# Patient Record
Sex: MALE | Race: WHITE | Employment: STUDENT | URBAN - METROPOLITAN AREA
[De-identification: names, ages, dates, MRNs, and addresses within clinical notes are randomized per-mention and may not be internally consistent; named-entity substitution may affect disease eponyms.]

---

## 2021-07-07 ENCOUNTER — LAB ENCOUNTER (OUTPATIENT)
Dept: LAB | Age: 21
End: 2021-07-07
Attending: NURSE PRACTITIONER
Payer: COMMERCIAL

## 2021-07-07 ENCOUNTER — OFFICE VISIT (OUTPATIENT)
Dept: INTERNAL MEDICINE CLINIC | Facility: CLINIC | Age: 21
End: 2021-07-07
Payer: COMMERCIAL

## 2021-07-07 VITALS
DIASTOLIC BLOOD PRESSURE: 78 MMHG | OXYGEN SATURATION: 98 % | TEMPERATURE: 99 F | WEIGHT: 164 LBS | SYSTOLIC BLOOD PRESSURE: 116 MMHG | BODY MASS INDEX: 25.74 KG/M2 | HEART RATE: 76 BPM | HEIGHT: 67 IN | RESPIRATION RATE: 16 BRPM

## 2021-07-07 DIAGNOSIS — Z11.3 ENCOUNTER FOR SCREENING EXAMINATION FOR SEXUALLY TRANSMITTED DISEASE: ICD-10-CM

## 2021-07-07 DIAGNOSIS — J01.10 ACUTE NON-RECURRENT FRONTAL SINUSITIS: ICD-10-CM

## 2021-07-07 DIAGNOSIS — Z00.00 ANNUAL PHYSICAL EXAM: Primary | ICD-10-CM

## 2021-07-07 DIAGNOSIS — Z00.00 LABORATORY EXAMINATION ORDERED AS PART OF A ROUTINE GENERAL MEDICAL EXAMINATION: ICD-10-CM

## 2021-07-07 DIAGNOSIS — T78.40XS ALLERGY, SEQUELA: ICD-10-CM

## 2021-07-07 PROCEDURE — 3008F BODY MASS INDEX DOCD: CPT | Performed by: NURSE PRACTITIONER

## 2021-07-07 PROCEDURE — 3074F SYST BP LT 130 MM HG: CPT | Performed by: NURSE PRACTITIONER

## 2021-07-07 PROCEDURE — 3078F DIAST BP <80 MM HG: CPT | Performed by: NURSE PRACTITIONER

## 2021-07-07 PROCEDURE — 87591 N.GONORRHOEAE DNA AMP PROB: CPT | Performed by: NURSE PRACTITIONER

## 2021-07-07 PROCEDURE — 87491 CHLMYD TRACH DNA AMP PROBE: CPT | Performed by: NURSE PRACTITIONER

## 2021-07-07 PROCEDURE — 99385 PREV VISIT NEW AGE 18-39: CPT | Performed by: NURSE PRACTITIONER

## 2021-07-07 RX ORDER — FLUTICASONE PROPIONATE 50 MCG
SPRAY, SUSPENSION (ML) NASAL DAILY
COMMUNITY
End: 2021-11-27

## 2021-07-07 RX ORDER — CETIRIZINE HYDROCHLORIDE 10 MG/1
10 TABLET ORAL DAILY
COMMUNITY
End: 2021-07-07

## 2021-07-07 RX ORDER — FEXOFENADINE HCL 180 MG/1
180 TABLET ORAL DAILY
Qty: 30 TABLET | Refills: 0 | COMMUNITY
Start: 2021-07-07

## 2021-07-07 RX ORDER — AZITHROMYCIN 250 MG/1
TABLET, FILM COATED ORAL
Qty: 6 TABLET | Refills: 0 | Status: SHIPPED | OUTPATIENT
Start: 2021-07-07 | End: 2021-07-12

## 2021-07-07 NOTE — PATIENT INSTRUCTIONS
Afrin- dont use longer than 3 days  Stop zyrtec- add allegra- generic is ok    Prevention Guidelines, Men Ages 25 to 44  Screening tests and vaccines are an important part of managing your health.  A screening test is done to find possible disorders or di Chickenpox (varicella) All men in this age group who have no record of this infection or vaccine 2 doses; the second dose should be given at least 4 weeks after the first dose   Hepatitis A Men at increased risk for infection – talk with your healthcare pr active At routine exams   Skin cancer All men in this age group. At routine exams. You may be reminded to avoid intentional tanning and tanning beds.     1Those who are 25years of age, who are not up-to-date on their childhood immunizations, should get al

## 2021-07-07 NOTE — PROGRESS NOTES
Wellness Exam    CC: Patient is presenting for a wellness exam    HPI:   Current Complaints: new to our office. He goes to school in [place]. Hx of allergies, takes zyrtec and a nasal spray for about 1 year.  Over the last month notes more nasal congestion, PN m)   Wt 164 lb (74.4 kg)   SpO2 98%   BMI 25.69 kg/m²   Physical Exam   Constitutional: He is oriented to person, place, and time. He appears well-developed. No distress. HENT: Normocephalic and atraumatic. Nose normal. TMs pearly gray, + light reflex.

## 2021-07-08 LAB
C TRACH DNA SPEC QL NAA+PROBE: NEGATIVE
N GONORRHOEA DNA SPEC QL NAA+PROBE: NEGATIVE

## 2021-07-23 ENCOUNTER — LAB ENCOUNTER (OUTPATIENT)
Dept: LAB | Age: 21
End: 2021-07-23
Attending: NURSE PRACTITIONER
Payer: COMMERCIAL

## 2021-07-23 DIAGNOSIS — Z00.00 LABORATORY EXAMINATION ORDERED AS PART OF A ROUTINE GENERAL MEDICAL EXAMINATION: ICD-10-CM

## 2021-07-23 LAB
ALBUMIN SERPL-MCNC: 4.1 G/DL (ref 3.4–5)
ALBUMIN/GLOB SERPL: 1.2 {RATIO} (ref 1–2)
ALP LIVER SERPL-CCNC: 80 U/L
ALT SERPL-CCNC: 32 U/L
ANION GAP SERPL CALC-SCNC: 5 MMOL/L (ref 0–18)
AST SERPL-CCNC: 22 U/L (ref 15–37)
BASOPHILS # BLD AUTO: 0.02 X10(3) UL (ref 0–0.2)
BASOPHILS NFR BLD AUTO: 0.3 %
BILIRUB SERPL-MCNC: 0.8 MG/DL (ref 0.1–2)
BILIRUB UR QL STRIP.AUTO: NEGATIVE
BUN BLD-MCNC: 15 MG/DL (ref 7–18)
BUN/CREAT SERPL: 18.1 (ref 10–20)
CALCIUM BLD-MCNC: 9 MG/DL (ref 8.5–10.1)
CHLORIDE SERPL-SCNC: 106 MMOL/L (ref 98–112)
CHOLEST SMN-MCNC: 110 MG/DL (ref ?–200)
CLARITY UR REFRACT.AUTO: CLEAR
CO2 SERPL-SCNC: 26 MMOL/L (ref 21–32)
CREAT BLD-MCNC: 0.83 MG/DL
DEPRECATED RDW RBC AUTO: 43 FL (ref 35.1–46.3)
EOSINOPHIL # BLD AUTO: 0.1 X10(3) UL (ref 0–0.7)
EOSINOPHIL NFR BLD AUTO: 1.3 %
ERYTHROCYTE [DISTWIDTH] IN BLOOD BY AUTOMATED COUNT: 12.9 % (ref 11–15)
EST. AVERAGE GLUCOSE BLD GHB EST-MCNC: 103 MG/DL (ref 68–126)
GLOBULIN PLAS-MCNC: 3.4 G/DL (ref 2.8–4.4)
GLUCOSE BLD-MCNC: 83 MG/DL (ref 70–99)
GLUCOSE UR STRIP.AUTO-MCNC: NEGATIVE MG/DL
HBA1C MFR BLD HPLC: 5.2 % (ref ?–5.7)
HCT VFR BLD AUTO: 46.7 %
HDLC SERPL-MCNC: 47 MG/DL (ref 40–59)
HGB BLD-MCNC: 15.1 G/DL
IMM GRANULOCYTES # BLD AUTO: 0.03 X10(3) UL (ref 0–1)
IMM GRANULOCYTES NFR BLD: 0.4 %
LDLC SERPL CALC-MCNC: 54 MG/DL (ref ?–100)
LEUKOCYTE ESTERASE UR QL STRIP.AUTO: NEGATIVE
LYMPHOCYTES # BLD AUTO: 1.51 X10(3) UL (ref 1.5–5)
LYMPHOCYTES NFR BLD AUTO: 19.6 %
M PROTEIN MFR SERPL ELPH: 7.5 G/DL (ref 6.4–8.2)
MCH RBC QN AUTO: 29.2 PG (ref 26–34)
MCHC RBC AUTO-ENTMCNC: 32.3 G/DL (ref 31–37)
MCV RBC AUTO: 90.3 FL
MONOCYTES # BLD AUTO: 1.05 X10(3) UL (ref 0.1–1)
MONOCYTES NFR BLD AUTO: 13.6 %
NEUTROPHILS # BLD AUTO: 4.99 X10 (3) UL (ref 1.5–7.7)
NEUTROPHILS # BLD AUTO: 4.99 X10(3) UL (ref 1.5–7.7)
NEUTROPHILS NFR BLD AUTO: 64.8 %
NITRITE UR QL STRIP.AUTO: NEGATIVE
NONHDLC SERPL-MCNC: 63 MG/DL (ref ?–130)
OSMOLALITY SERPL CALC.SUM OF ELEC: 284 MOSM/KG (ref 275–295)
PATIENT FASTING Y/N/NP: YES
PATIENT FASTING Y/N/NP: YES
PH UR STRIP.AUTO: 7 [PH] (ref 5–8)
PLATELET # BLD AUTO: 202 10(3)UL (ref 150–450)
POTASSIUM SERPL-SCNC: 3.8 MMOL/L (ref 3.5–5.1)
PROT UR STRIP.AUTO-MCNC: NEGATIVE MG/DL
RBC # BLD AUTO: 5.17 X10(6)UL
RBC UR QL AUTO: NEGATIVE
SODIUM SERPL-SCNC: 137 MMOL/L (ref 136–145)
SP GR UR STRIP.AUTO: 1 (ref 1–1.03)
TRIGL SERPL-MCNC: 30 MG/DL (ref 30–149)
TSI SER-ACNC: 0.38 MIU/ML (ref 0.36–3.74)
UROBILINOGEN UR STRIP.AUTO-MCNC: <2 MG/DL
VLDLC SERPL CALC-MCNC: 4 MG/DL (ref 0–30)
WBC # BLD AUTO: 7.7 X10(3) UL (ref 4–11)

## 2021-07-23 PROCEDURE — 80061 LIPID PANEL: CPT | Performed by: NURSE PRACTITIONER

## 2021-07-23 PROCEDURE — 83036 HEMOGLOBIN GLYCOSYLATED A1C: CPT | Performed by: NURSE PRACTITIONER

## 2021-07-23 PROCEDURE — 81003 URINALYSIS AUTO W/O SCOPE: CPT | Performed by: NURSE PRACTITIONER

## 2021-07-23 PROCEDURE — 80050 GENERAL HEALTH PANEL: CPT | Performed by: NURSE PRACTITIONER

## 2021-11-27 ENCOUNTER — OFFICE VISIT (OUTPATIENT)
Dept: FAMILY MEDICINE CLINIC | Facility: CLINIC | Age: 21
End: 2021-11-27
Payer: COMMERCIAL

## 2021-11-27 VITALS
RESPIRATION RATE: 18 BRPM | HEIGHT: 68 IN | SYSTOLIC BLOOD PRESSURE: 118 MMHG | BODY MASS INDEX: 24.25 KG/M2 | OXYGEN SATURATION: 98 % | WEIGHT: 160 LBS | TEMPERATURE: 98 F | DIASTOLIC BLOOD PRESSURE: 64 MMHG | HEART RATE: 75 BPM

## 2021-11-27 DIAGNOSIS — H10.33 ACUTE BACTERIAL CONJUNCTIVITIS OF BOTH EYES: Primary | ICD-10-CM

## 2021-11-27 PROCEDURE — 3078F DIAST BP <80 MM HG: CPT | Performed by: NURSE PRACTITIONER

## 2021-11-27 PROCEDURE — 3074F SYST BP LT 130 MM HG: CPT | Performed by: NURSE PRACTITIONER

## 2021-11-27 PROCEDURE — 3008F BODY MASS INDEX DOCD: CPT | Performed by: NURSE PRACTITIONER

## 2021-11-27 PROCEDURE — 99213 OFFICE O/P EST LOW 20 MIN: CPT | Performed by: NURSE PRACTITIONER

## 2021-11-27 RX ORDER — POLYMYXIN B SULFATE AND TRIMETHOPRIM 1; 10000 MG/ML; [USP'U]/ML
SOLUTION OPHTHALMIC
Qty: 1 EACH | Refills: 0 | Status: SHIPPED | OUTPATIENT
Start: 2021-11-27

## 2021-11-27 RX ORDER — POLYMYXIN B SULFATE AND TRIMETHOPRIM 1; 10000 MG/ML; [USP'U]/ML
SOLUTION OPHTHALMIC
Qty: 1 EACH | Refills: 0 | Status: SHIPPED | OUTPATIENT
Start: 2021-11-27 | End: 2021-11-27

## 2021-11-27 NOTE — PROGRESS NOTES
CHIEF COMPLAINT:   Patient presents with:  Conjunctivitis: both eyes, + exudate,       HPI:   Sonja Arellano is a 22year old male who presents with concern for conjunctivitis. Notes symptoms started yesterday.  Patient reports bilat eye redness, yellow disc discharge to bilat inner canthus. + exudate to eyelids. No swelling or erythema noted to eyelids or periorbital areas. Pt appears comfortable and is able to keep eye open without difficulty. No rapid blinking, excessive tearing or photophobia noted.  No pr concerns. Bacterial Conjunctivitis    You have an infection in the membranes covering the white part of the eye. This part of the eye is called the conjunctiva. The infection is called conjunctivitis.  The most common symptoms of conjunctivitis inclu reviewed this educational content on 4/1/2020  © 8623-3893 [employer]. All rights reserved. This information is not intended as a substitute for professional medical care. Always follow your healthcare professional's instructions.

## 2021-11-27 NOTE — PATIENT INSTRUCTIONS
1. Rest. Avoid rubbing or touching the eye when possible. 2. Polytrim eye drops as prescribed. 3. Wash hands frequently. 4. Compresses as discussed.   5. Follow up with PMD in 3-4 days for reeval. Follow up sooner or go to the emergency department immedi gone.    Follow-up care  Follow up with your healthcare provider, or as advised.   When to seek medical advice  Call your healthcare provider right away if any of these occur:  · Worsening vision  · Increasing pain in the eye  · Increasing swelling or redne

## 2022-08-05 ENCOUNTER — OFFICE VISIT (OUTPATIENT)
Dept: INTERNAL MEDICINE CLINIC | Facility: CLINIC | Age: 22
End: 2022-08-05
Payer: COMMERCIAL

## 2022-08-05 VITALS
HEART RATE: 90 BPM | WEIGHT: 174 LBS | RESPIRATION RATE: 16 BRPM | OXYGEN SATURATION: 96 % | BODY MASS INDEX: 26.99 KG/M2 | SYSTOLIC BLOOD PRESSURE: 122 MMHG | TEMPERATURE: 99 F | HEIGHT: 67.32 IN | DIASTOLIC BLOOD PRESSURE: 78 MMHG

## 2022-08-05 DIAGNOSIS — Z00.00 ANNUAL PHYSICAL EXAM: Primary | ICD-10-CM

## 2022-08-05 DIAGNOSIS — Z00.00 LABORATORY EXAMINATION ORDERED AS PART OF A ROUTINE GENERAL MEDICAL EXAMINATION: ICD-10-CM

## 2022-08-05 PROBLEM — J30.2 SEASONAL ALLERGIC RHINITIS: Status: ACTIVE | Noted: 2021-10-15

## 2022-08-05 PROCEDURE — 90715 TDAP VACCINE 7 YRS/> IM: CPT | Performed by: NURSE PRACTITIONER

## 2022-08-05 PROCEDURE — 90471 IMMUNIZATION ADMIN: CPT | Performed by: NURSE PRACTITIONER

## 2022-08-05 PROCEDURE — 3008F BODY MASS INDEX DOCD: CPT | Performed by: NURSE PRACTITIONER

## 2022-08-05 PROCEDURE — 99395 PREV VISIT EST AGE 18-39: CPT | Performed by: NURSE PRACTITIONER

## 2022-08-05 PROCEDURE — 3074F SYST BP LT 130 MM HG: CPT | Performed by: NURSE PRACTITIONER

## 2022-08-05 PROCEDURE — 3078F DIAST BP <80 MM HG: CPT | Performed by: NURSE PRACTITIONER

## 2022-08-11 ENCOUNTER — LAB ENCOUNTER (OUTPATIENT)
Dept: LAB | Age: 22
End: 2022-08-11
Attending: NURSE PRACTITIONER
Payer: COMMERCIAL

## 2022-08-11 DIAGNOSIS — Z00.00 LABORATORY EXAMINATION ORDERED AS PART OF A ROUTINE GENERAL MEDICAL EXAMINATION: ICD-10-CM

## 2022-08-11 LAB
ALBUMIN SERPL-MCNC: 4.4 G/DL (ref 3.4–5)
ALBUMIN/GLOB SERPL: 1.4 {RATIO} (ref 1–2)
ALP LIVER SERPL-CCNC: 65 U/L
ALT SERPL-CCNC: 46 U/L
ANION GAP SERPL CALC-SCNC: 3 MMOL/L (ref 0–18)
AST SERPL-CCNC: 24 U/L (ref 15–37)
BASOPHILS # BLD AUTO: 0.03 X10(3) UL (ref 0–0.2)
BASOPHILS NFR BLD AUTO: 0.6 %
BILIRUB SERPL-MCNC: 0.7 MG/DL (ref 0.1–2)
BILIRUB UR QL STRIP.AUTO: NEGATIVE
BUN BLD-MCNC: 18 MG/DL (ref 7–18)
CALCIUM BLD-MCNC: 9.4 MG/DL (ref 8.5–10.1)
CHLORIDE SERPL-SCNC: 107 MMOL/L (ref 98–112)
CHOLEST SERPL-MCNC: 126 MG/DL (ref ?–200)
CLARITY UR REFRACT.AUTO: CLEAR
CO2 SERPL-SCNC: 27 MMOL/L (ref 21–32)
COLOR UR AUTO: YELLOW
CREAT BLD-MCNC: 0.94 MG/DL
EOSINOPHIL # BLD AUTO: 0.21 X10(3) UL (ref 0–0.7)
EOSINOPHIL NFR BLD AUTO: 4.2 %
ERYTHROCYTE [DISTWIDTH] IN BLOOD BY AUTOMATED COUNT: 12.3 %
EST. AVERAGE GLUCOSE BLD GHB EST-MCNC: 105 MG/DL (ref 68–126)
FASTING PATIENT LIPID ANSWER: YES
FASTING STATUS PATIENT QL REPORTED: YES
GFR SERPLBLD BASED ON 1.73 SQ M-ARVRAT: 118 ML/MIN/1.73M2 (ref 60–?)
GLOBULIN PLAS-MCNC: 3.2 G/DL (ref 2.8–4.4)
GLUCOSE BLD-MCNC: 100 MG/DL (ref 70–99)
GLUCOSE UR STRIP.AUTO-MCNC: NEGATIVE MG/DL
HBA1C MFR BLD: 5.3 % (ref ?–5.7)
HCT VFR BLD AUTO: 46.3 %
HDLC SERPL-MCNC: 45 MG/DL (ref 40–59)
HGB BLD-MCNC: 15.7 G/DL
IMM GRANULOCYTES # BLD AUTO: 0.02 X10(3) UL (ref 0–1)
IMM GRANULOCYTES NFR BLD: 0.4 %
KETONES UR STRIP.AUTO-MCNC: NEGATIVE MG/DL
LDLC SERPL CALC-MCNC: 71 MG/DL (ref ?–100)
LEUKOCYTE ESTERASE UR QL STRIP.AUTO: NEGATIVE
LYMPHOCYTES # BLD AUTO: 1.75 X10(3) UL (ref 1–4)
LYMPHOCYTES NFR BLD AUTO: 35.1 %
MCH RBC QN AUTO: 29.8 PG (ref 26–34)
MCHC RBC AUTO-ENTMCNC: 33.9 G/DL (ref 31–37)
MCV RBC AUTO: 87.9 FL
MONOCYTES # BLD AUTO: 0.6 X10(3) UL (ref 0.1–1)
MONOCYTES NFR BLD AUTO: 12 %
NEUTROPHILS # BLD AUTO: 2.37 X10 (3) UL (ref 1.5–7.7)
NEUTROPHILS # BLD AUTO: 2.37 X10(3) UL (ref 1.5–7.7)
NEUTROPHILS NFR BLD AUTO: 47.7 %
NITRITE UR QL STRIP.AUTO: NEGATIVE
NONHDLC SERPL-MCNC: 81 MG/DL (ref ?–130)
OSMOLALITY SERPL CALC.SUM OF ELEC: 286 MOSM/KG (ref 275–295)
PH UR STRIP.AUTO: 7.5 [PH] (ref 5–8)
PLATELET # BLD AUTO: 213 10(3)UL (ref 150–450)
POTASSIUM SERPL-SCNC: 4.2 MMOL/L (ref 3.5–5.1)
PROT SERPL-MCNC: 7.6 G/DL (ref 6.4–8.2)
PROT UR STRIP.AUTO-MCNC: NEGATIVE MG/DL
RBC # BLD AUTO: 5.27 X10(6)UL
RBC UR QL AUTO: NEGATIVE
SODIUM SERPL-SCNC: 137 MMOL/L (ref 136–145)
SP GR UR STRIP.AUTO: 1.02 (ref 1–1.03)
TRIGL SERPL-MCNC: 41 MG/DL (ref 30–149)
TSI SER-ACNC: 0.5 MIU/ML (ref 0.36–3.74)
UROBILINOGEN UR STRIP.AUTO-MCNC: 0.2 MG/DL
VLDLC SERPL CALC-MCNC: 6 MG/DL (ref 0–30)
WBC # BLD AUTO: 5 X10(3) UL (ref 4–11)

## 2022-08-11 PROCEDURE — 80061 LIPID PANEL: CPT | Performed by: NURSE PRACTITIONER

## 2022-08-11 PROCEDURE — 81003 URINALYSIS AUTO W/O SCOPE: CPT | Performed by: NURSE PRACTITIONER

## 2022-08-11 PROCEDURE — 80050 GENERAL HEALTH PANEL: CPT | Performed by: NURSE PRACTITIONER

## 2022-08-11 PROCEDURE — 83036 HEMOGLOBIN GLYCOSYLATED A1C: CPT | Performed by: NURSE PRACTITIONER

## 2022-08-29 ENCOUNTER — TELEMEDICINE (OUTPATIENT)
Dept: INTERNAL MEDICINE CLINIC | Facility: CLINIC | Age: 22
End: 2022-08-29
Payer: COMMERCIAL

## 2022-08-29 DIAGNOSIS — U07.1 COVID-19: Primary | ICD-10-CM

## 2022-12-20 ENCOUNTER — PATIENT MESSAGE (OUTPATIENT)
Dept: INTERNAL MEDICINE CLINIC | Facility: CLINIC | Age: 22
End: 2022-12-20

## 2022-12-20 ENCOUNTER — TELEMEDICINE (OUTPATIENT)
Dept: INTERNAL MEDICINE CLINIC | Facility: CLINIC | Age: 22
End: 2022-12-20
Payer: COMMERCIAL

## 2022-12-20 DIAGNOSIS — J01.40 ACUTE NON-RECURRENT PANSINUSITIS: Primary | ICD-10-CM

## 2022-12-20 PROCEDURE — 99213 OFFICE O/P EST LOW 20 MIN: CPT

## 2022-12-20 RX ORDER — AMOXICILLIN AND CLAVULANATE POTASSIUM 875; 125 MG/1; MG/1
1 TABLET, FILM COATED ORAL 2 TIMES DAILY
Qty: 20 TABLET | Refills: 0 | Status: SHIPPED | OUTPATIENT
Start: 2022-12-20 | End: 2022-12-30

## 2022-12-20 RX ORDER — METHYLPREDNISOLONE 4 MG/1
TABLET ORAL
Qty: 21 TABLET | Refills: 0 | Status: SHIPPED | OUTPATIENT
Start: 2022-12-20

## 2022-12-20 NOTE — TELEPHONE ENCOUNTER
From: Venus Almonte  Sent: 12/20/2022 12:46 PM CST  To: [facility] Clinical Staff  Subject: Illness     Okay. I just took an at home test about 30 minutes ago.  That one just came back negative as well

## 2023-09-05 ENCOUNTER — PATIENT MESSAGE (OUTPATIENT)
Dept: INTERNAL MEDICINE CLINIC | Facility: CLINIC | Age: 23
End: 2023-09-05

## 2023-09-06 ENCOUNTER — E-VISIT (OUTPATIENT)
Dept: TELEHEALTH | Age: 23
End: 2023-09-06
Payer: COMMERCIAL

## 2023-09-06 DIAGNOSIS — U07.1 COVID: Primary | ICD-10-CM

## 2023-09-06 NOTE — TELEPHONE ENCOUNTER
From: Narcisa Best  To: STANISLAW Rojas  Sent: 9/5/2023 8:51 PM CDT  Subject: positive Covid-19 Test    Good Evening Nurse Kaiden Mcdonough    I was feeling under the weather today and took a covid test after work and it was positive. I used the Binax-Now test, so it's reliable. My guess is I contracted Covid over my weekend visit up to [facility]. Tonight I feel feverish, tired and achy. I'm not sure if I need to schedule a video visit? Please let me know next steps. Also I have a full time job now at [employer] so I will need a note for work, plus I have new insurance that I need to update in my file.

## 2023-09-06 NOTE — PROGRESS NOTES
Faisal Gao is a 26year old male. HPI:   See answers to questions above. Current Outpatient Medications   Medication Sig Dispense Refill    methylPREDNISolone 4 MG Oral Tablet Therapy Pack Take as directed 21 tablet 0    fexofenadine 180 MG Oral Tab Take 1 tablet (180 mg total) by mouth daily. 30 tablet 0      No past medical history on file. No past surgical history on file. No family history on file. Social History:  Social History     Socioeconomic History    Marital status: Single   Tobacco Use    Smoking status: Never    Smokeless tobacco: Never   Vaping Use    Vaping Use: Never used   Substance and Sexual Activity    Alcohol use: Yes    Drug use: Yes     Types: Cannabis         ASSESSMENT AND PLAN:     Covid  (primary encounter diagnosis)    Discussed Quaratine guidelines and OTC meds to take.     Meds & Refills for this Visit:  Requested Prescriptions      No prescriptions requested or ordered in this encounter       Duration of  the service:  5 minutes    Patient advised to follow up with PCP if no improvement or worsening of symptoms  Refer to [employer] message for specific patient instructions

## 2023-12-29 ENCOUNTER — OFFICE VISIT (OUTPATIENT)
Dept: INTERNAL MEDICINE CLINIC | Facility: CLINIC | Age: 23
End: 2023-12-29
Payer: COMMERCIAL

## 2023-12-29 VITALS
SYSTOLIC BLOOD PRESSURE: 118 MMHG | OXYGEN SATURATION: 99 % | HEART RATE: 74 BPM | TEMPERATURE: 97 F | RESPIRATION RATE: 16 BRPM | HEIGHT: 67 IN | BODY MASS INDEX: 26.84 KG/M2 | WEIGHT: 171 LBS | DIASTOLIC BLOOD PRESSURE: 74 MMHG

## 2023-12-29 DIAGNOSIS — Z23 NEED FOR INFLUENZA VACCINATION: ICD-10-CM

## 2023-12-29 DIAGNOSIS — R22.41 LOCALIZED SWELLING OF RIGHT FOOT: ICD-10-CM

## 2023-12-29 DIAGNOSIS — Z00.00 LABORATORY EXAMINATION ORDERED AS PART OF A ROUTINE GENERAL MEDICAL EXAMINATION: ICD-10-CM

## 2023-12-29 DIAGNOSIS — Z00.00 ANNUAL PHYSICAL EXAM: Primary | ICD-10-CM

## 2023-12-29 PROCEDURE — 99395 PREV VISIT EST AGE 18-39: CPT | Performed by: NURSE PRACTITIONER

## 2023-12-29 PROCEDURE — 3008F BODY MASS INDEX DOCD: CPT | Performed by: NURSE PRACTITIONER

## 2023-12-29 PROCEDURE — 90686 IIV4 VACC NO PRSV 0.5 ML IM: CPT | Performed by: NURSE PRACTITIONER

## 2023-12-29 PROCEDURE — 3078F DIAST BP <80 MM HG: CPT | Performed by: NURSE PRACTITIONER

## 2023-12-29 PROCEDURE — 90471 IMMUNIZATION ADMIN: CPT | Performed by: NURSE PRACTITIONER

## 2023-12-29 PROCEDURE — 3074F SYST BP LT 130 MM HG: CPT | Performed by: NURSE PRACTITIONER

## 2023-12-29 RX ORDER — CETIRIZINE HYDROCHLORIDE 10 MG/1
10 TABLET ORAL AS NEEDED
COMMUNITY

## 2024-01-02 ENCOUNTER — HOSPITAL ENCOUNTER (OUTPATIENT)
Dept: GENERAL RADIOLOGY | Age: 24
Discharge: HOME | End: 2024-01-02
Attending: NURSE PRACTITIONER
Payer: COMMERCIAL

## 2024-01-02 ENCOUNTER — LAB ENCOUNTER (OUTPATIENT)
Dept: LAB | Age: 24
End: 2024-01-02
Attending: NURSE PRACTITIONER
Payer: COMMERCIAL

## 2024-01-02 DIAGNOSIS — R22.41 LOCALIZED SWELLING OF RIGHT FOOT: ICD-10-CM

## 2024-01-02 DIAGNOSIS — Z00.00 LABORATORY EXAMINATION ORDERED AS PART OF A ROUTINE GENERAL MEDICAL EXAMINATION: ICD-10-CM

## 2024-01-02 LAB
BASOPHILS # BLD AUTO: 0.02 X10(3) UL (ref 0–0.2)
BASOPHILS NFR BLD AUTO: 0.3 %
BILIRUB UR QL STRIP.AUTO: NEGATIVE
CLARITY UR REFRACT.AUTO: CLEAR
COLOR UR AUTO: COLORLESS
EOSINOPHIL # BLD AUTO: 0.07 X10(3) UL (ref 0–0.7)
EOSINOPHIL NFR BLD AUTO: 1.1 %
ERYTHROCYTE [DISTWIDTH] IN BLOOD BY AUTOMATED COUNT: 12.2 %
GLUCOSE UR STRIP.AUTO-MCNC: NORMAL MG/DL
HCT VFR BLD AUTO: 45.3 %
HGB BLD-MCNC: 16.1 G/DL
IMM GRANULOCYTES # BLD AUTO: 0.02 X10(3) UL (ref 0–1)
IMM GRANULOCYTES NFR BLD: 0.3 %
KETONES UR STRIP.AUTO-MCNC: NEGATIVE MG/DL
LEUKOCYTE ESTERASE UR QL STRIP.AUTO: NEGATIVE
LYMPHOCYTES # BLD AUTO: 1.81 X10(3) UL (ref 1–4)
LYMPHOCYTES NFR BLD AUTO: 29.1 %
MCH RBC QN AUTO: 29.7 PG (ref 26–34)
MCHC RBC AUTO-ENTMCNC: 35.5 G/DL (ref 31–37)
MCV RBC AUTO: 83.4 FL
MONOCYTES # BLD AUTO: 0.67 X10(3) UL (ref 0.1–1)
MONOCYTES NFR BLD AUTO: 10.8 %
NEUTROPHILS # BLD AUTO: 3.62 X10 (3) UL (ref 1.5–7.7)
NEUTROPHILS # BLD AUTO: 3.62 X10(3) UL (ref 1.5–7.7)
NEUTROPHILS NFR BLD AUTO: 58.4 %
NITRITE UR QL STRIP.AUTO: NEGATIVE
PH UR STRIP.AUTO: 7 [PH] (ref 5–8)
PLATELET # BLD AUTO: 228 10(3)UL (ref 150–450)
PROT UR STRIP.AUTO-MCNC: NEGATIVE MG/DL
RBC # BLD AUTO: 5.43 X10(6)UL
RBC UR QL AUTO: NEGATIVE
SP GR UR STRIP.AUTO: <1.005 (ref 1–1.03)
UROBILINOGEN UR STRIP.AUTO-MCNC: NORMAL MG/DL
WBC # BLD AUTO: 6.2 X10(3) UL (ref 4–11)

## 2024-01-02 PROCEDURE — 80061 LIPID PANEL: CPT

## 2024-01-02 PROCEDURE — 81003 URINALYSIS AUTO W/O SCOPE: CPT

## 2024-01-02 PROCEDURE — 80053 COMPREHEN METABOLIC PANEL: CPT

## 2024-01-02 PROCEDURE — 73630 X-RAY EXAM OF FOOT: CPT | Performed by: NURSE PRACTITIONER

## 2024-01-02 PROCEDURE — 84443 ASSAY THYROID STIM HORMONE: CPT

## 2024-01-02 PROCEDURE — 85025 COMPLETE CBC W/AUTO DIFF WBC: CPT

## 2024-01-03 ENCOUNTER — PATIENT MESSAGE (OUTPATIENT)
Dept: INTERNAL MEDICINE CLINIC | Facility: CLINIC | Age: 24
End: 2024-01-03

## 2024-01-03 DIAGNOSIS — E83.52 SERUM CALCIUM ELEVATED: Primary | ICD-10-CM

## 2024-01-03 LAB
ALBUMIN SERPL-MCNC: 4.5 G/DL (ref 3.4–5)
ALBUMIN/GLOB SERPL: 1.3 {RATIO} (ref 1–2)
ALP LIVER SERPL-CCNC: 74 U/L
ALT SERPL-CCNC: 59 U/L
ANION GAP SERPL CALC-SCNC: 7 MMOL/L (ref 0–18)
AST SERPL-CCNC: 27 U/L (ref 15–37)
BILIRUB SERPL-MCNC: 0.7 MG/DL (ref 0.1–2)
BUN BLD-MCNC: 11 MG/DL (ref 9–23)
CALCIUM BLD-MCNC: 10.2 MG/DL (ref 8.5–10.1)
CHLORIDE SERPL-SCNC: 104 MMOL/L (ref 98–112)
CHOLEST SERPL-MCNC: 125 MG/DL (ref ?–200)
CO2 SERPL-SCNC: 27 MMOL/L (ref 21–32)
CREAT BLD-MCNC: 0.98 MG/DL
EGFRCR SERPLBLD CKD-EPI 2021: 112 ML/MIN/1.73M2 (ref 60–?)
FASTING PATIENT LIPID ANSWER: YES
FASTING STATUS PATIENT QL REPORTED: YES
GLOBULIN PLAS-MCNC: 3.4 G/DL (ref 2.8–4.4)
GLUCOSE BLD-MCNC: 99 MG/DL (ref 70–99)
HDLC SERPL-MCNC: 57 MG/DL (ref 40–59)
LDLC SERPL CALC-MCNC: 57 MG/DL (ref ?–100)
NONHDLC SERPL-MCNC: 68 MG/DL (ref ?–130)
OSMOLALITY SERPL CALC.SUM OF ELEC: 285 MOSM/KG (ref 275–295)
POTASSIUM SERPL-SCNC: 4.1 MMOL/L (ref 3.5–5.1)
PROT SERPL-MCNC: 7.9 G/DL (ref 6.4–8.2)
SODIUM SERPL-SCNC: 138 MMOL/L (ref 136–145)
TRIGL SERPL-MCNC: 44 MG/DL (ref 30–149)
TSI SER-ACNC: 0.58 MIU/ML (ref 0.36–3.74)
VLDLC SERPL CALC-MCNC: 6 MG/DL (ref 0–30)

## 2024-01-03 NOTE — TELEPHONE ENCOUNTER
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 I currently do not take any supplements that contain calcium. What should I do to try and get them to normal levels?    Thanks,  Ryan Bowser

## 2024-01-03 NOTE — TELEPHONE ENCOUNTER
Per Gisela, I suggest we recheck in 1 month, it was just slightly high, if still high next time will look into if parathyroid levels are abnormal

## 2024-03-22 ENCOUNTER — LAB ENCOUNTER (OUTPATIENT)
Dept: LAB | Age: 24
End: 2024-03-22
Attending: NURSE PRACTITIONER
Payer: COMMERCIAL

## 2024-03-22 DIAGNOSIS — E83.52 SERUM CALCIUM ELEVATED: ICD-10-CM

## 2024-03-22 LAB
ALBUMIN SERPL-MCNC: 4.3 G/DL (ref 3.4–5)
ALBUMIN/GLOB SERPL: 1.2 {RATIO} (ref 1–2)
ALP LIVER SERPL-CCNC: 68 U/L
ALT SERPL-CCNC: 54 U/L
ANION GAP SERPL CALC-SCNC: 1 MMOL/L (ref 0–18)
AST SERPL-CCNC: 22 U/L (ref 15–37)
BILIRUB SERPL-MCNC: 0.4 MG/DL (ref 0.1–2)
BUN BLD-MCNC: 16 MG/DL (ref 9–23)
CALCIUM BLD-MCNC: 9.4 MG/DL (ref 8.5–10.1)
CHLORIDE SERPL-SCNC: 106 MMOL/L (ref 98–112)
CO2 SERPL-SCNC: 30 MMOL/L (ref 21–32)
CREAT BLD-MCNC: 0.86 MG/DL
EGFRCR SERPLBLD CKD-EPI 2021: 126 ML/MIN/1.73M2 (ref 60–?)
FASTING STATUS PATIENT QL REPORTED: YES
GLOBULIN PLAS-MCNC: 3.5 G/DL (ref 2.8–4.4)
GLUCOSE BLD-MCNC: 98 MG/DL (ref 70–99)
OSMOLALITY SERPL CALC.SUM OF ELEC: 285 MOSM/KG (ref 275–295)
POTASSIUM SERPL-SCNC: 3.9 MMOL/L (ref 3.5–5.1)
PROT SERPL-MCNC: 7.8 G/DL (ref 6.4–8.2)
SODIUM SERPL-SCNC: 137 MMOL/L (ref 136–145)

## 2024-03-22 PROCEDURE — 80053 COMPREHEN METABOLIC PANEL: CPT | Performed by: NURSE PRACTITIONER

## 2024-05-24 ENCOUNTER — OFFICE VISIT (OUTPATIENT)
Dept: INTERNAL MEDICINE CLINIC | Facility: CLINIC | Age: 24
End: 2024-05-24

## 2024-05-24 VITALS
TEMPERATURE: 97 F | DIASTOLIC BLOOD PRESSURE: 70 MMHG | WEIGHT: 176 LBS | OXYGEN SATURATION: 98 % | BODY MASS INDEX: 27.62 KG/M2 | HEIGHT: 67 IN | SYSTOLIC BLOOD PRESSURE: 122 MMHG | RESPIRATION RATE: 14 BRPM | HEART RATE: 68 BPM

## 2024-05-24 DIAGNOSIS — F33.1 MODERATE RECURRENT MAJOR DEPRESSION (HCC): ICD-10-CM

## 2024-05-24 DIAGNOSIS — M70.62 TROCHANTERIC BURSITIS OF BOTH HIPS: Primary | ICD-10-CM

## 2024-05-24 DIAGNOSIS — M70.61 TROCHANTERIC BURSITIS OF BOTH HIPS: Primary | ICD-10-CM

## 2024-05-24 RX ORDER — ESCITALOPRAM OXALATE 10 MG/1
10 TABLET ORAL DAILY
Qty: 90 TABLET | Refills: 0 | Status: SHIPPED | OUTPATIENT
Start: 2024-05-24

## 2024-05-24 RX ORDER — NAPROXEN 500 MG/1
500 TABLET ORAL 2 TIMES DAILY WITH MEALS
Qty: 20 TABLET | Refills: 0 | Status: SHIPPED | OUTPATIENT
Start: 2024-05-24

## 2024-05-24 NOTE — PROGRESS NOTES
Subjective:   Patient ID: Ryan Bowser is a 23 year old male.    Hip Pain   Pain location: b/l lateral hips. This is a recurrent problem. Episode onset: 3-4 m. There has been no history of extremity trauma. The problem occurs constantly. The problem has been gradually worsening. The quality of the pain is described as dull and aching. The pain is moderate. Pertinent negatives include no fever, inability to bear weight, itching, joint locking, joint swelling, limited range of motion, numbness, stiffness or tingling. The symptoms are aggravated by contact and activity. He has tried rest for the symptoms. The treatment provided no relief.     Additionally pt reports worsering anhedonia for last few months, no H/S ideations    History/Other:   Review of Systems   Constitutional:  Negative for fever.   Musculoskeletal:  Negative for stiffness.   Skin:  Negative for itching.   Neurological:  Negative for tingling and numbness.   All other systems reviewed and are negative.    Current Outpatient Medications   Medication Sig Dispense Refill    naproxen 500 MG Oral Tab Take 1 tablet (500 mg total) by mouth 2 (two) times daily with meals. 20 tablet 0    escitalopram 10 MG Oral Tab Take 1 tablet (10 mg total) by mouth daily. 90 tablet 0    cetirizine 10 MG Oral Tab Take 1 tablet (10 mg total) by mouth as needed for Allergies.       Allergies:No Known Allergies    Objective:   Physical Exam  Vitals and nursing note reviewed.   Constitutional:       General: He is not in acute distress.     Appearance: Normal appearance.   Cardiovascular:      Rate and Rhythm: Normal rate and regular rhythm.      Pulses: Normal pulses.      Heart sounds: Normal heart sounds.   Pulmonary:      Effort: Pulmonary effort is normal.      Breath sounds: Normal breath sounds.   Musculoskeletal:      Right hip: Tenderness (bursa sac') present.      Left hip: Tenderness (bursa sac) present.   Neurological:      Mental Status: He is alert.   Psychiatric:          Mood and Affect: Mood normal.         Behavior: Behavior normal.         Thought Content: Thought content normal.         Assessment & Plan:   1. Trochanteric bursitis of both hips    2. Moderate recurrent major depression (HCC)      - finish naproxen as ordered for bursitis  - start lexapro 10 mg daily for depression treatment  No orders of the defined types were placed in this encounter.      Meds This Visit:  Requested Prescriptions     Signed Prescriptions Disp Refills    naproxen 500 MG Oral Tab 20 tablet 0     Sig: Take 1 tablet (500 mg total) by mouth 2 (two) times daily with meals.    escitalopram 10 MG Oral Tab 90 tablet 0     Sig: Take 1 tablet (10 mg total) by mouth daily.       Imaging & Referrals:  None

## 2024-08-18 DIAGNOSIS — F33.1 MODERATE RECURRENT MAJOR DEPRESSION (HCC): ICD-10-CM

## 2024-08-18 RX ORDER — ESCITALOPRAM OXALATE 10 MG/1
10 TABLET ORAL DAILY
Qty: 90 TABLET | Refills: 0 | Status: SHIPPED | OUTPATIENT
Start: 2024-08-18

## 2024-08-18 NOTE — TELEPHONE ENCOUNTER
Last time medication was refilled: 5/24/24  Next office visit due/scheduled: no apt  Last office visit: 6/27/24  Last Labs: 3/22/24

## 2024-11-20 DIAGNOSIS — F33.1 MODERATE RECURRENT MAJOR DEPRESSION (HCC): ICD-10-CM

## 2024-11-20 RX ORDER — ESCITALOPRAM OXALATE 10 MG/1
10 TABLET ORAL DAILY
Qty: 90 TABLET | Refills: 0 | Status: SHIPPED | OUTPATIENT
Start: 2024-11-20

## 2024-11-20 NOTE — TELEPHONE ENCOUNTER
Last time medication was refilled 08/18/2024  Last office visit  06/27/2024  Next office visit due/scheduled No future appointments.    Medication not on protocol, routed to Doctor Carpio.     Patient due for annual,  to coordinate.

## 2024-11-21 NOTE — TELEPHONE ENCOUNTER
Spoke to pt - he was at work and will call back to schedule - he also has new insurance to update

## 2024-12-27 DIAGNOSIS — F33.1 MODERATE RECURRENT MAJOR DEPRESSION (HCC): ICD-10-CM

## 2024-12-27 RX ORDER — ESCITALOPRAM OXALATE 10 MG/1
10 TABLET ORAL DAILY
Qty: 90 TABLET | Refills: 0 | Status: SHIPPED | OUTPATIENT
Start: 2024-12-27

## 2024-12-27 NOTE — TELEPHONE ENCOUNTER
Last time medication was refilled 11/20/2024  Last office visit  06/27/2024  Next office visit due/scheduled No future appointments.      Patient comment: Did not  my perscription before I left for vacation, pharmacy closed permenantly.       To be filled at: None [Patient requested: [facility] [address]

## 2025-01-16 ENCOUNTER — OFFICE VISIT (OUTPATIENT)
Dept: INTERNAL MEDICINE CLINIC | Facility: CLINIC | Age: 25
End: 2025-01-16
Payer: COMMERCIAL

## 2025-01-16 VITALS
OXYGEN SATURATION: 98 % | BODY MASS INDEX: 30.92 KG/M2 | HEART RATE: 80 BPM | RESPIRATION RATE: 14 BRPM | SYSTOLIC BLOOD PRESSURE: 122 MMHG | HEIGHT: 67 IN | TEMPERATURE: 98 F | WEIGHT: 197 LBS | DIASTOLIC BLOOD PRESSURE: 72 MMHG

## 2025-01-16 DIAGNOSIS — Z00.00 ANNUAL PHYSICAL EXAM: Primary | ICD-10-CM

## 2025-01-16 DIAGNOSIS — F33.1 MODERATE RECURRENT MAJOR DEPRESSION (HCC): ICD-10-CM

## 2025-01-16 DIAGNOSIS — F41.9 ANXIETY: ICD-10-CM

## 2025-01-16 RX ORDER — BUPROPION HYDROCHLORIDE 150 MG/1
150 TABLET ORAL DAILY
Qty: 30 TABLET | Refills: 0 | Status: SHIPPED | OUTPATIENT
Start: 2025-01-16

## 2025-01-16 NOTE — PROGRESS NOTES
Wellness Exam    CC: Patient is presenting for a wellness exam    HPI:   Current Complaints: 20+ weight gain since starting lexapro. Anxiety/depression symptoms improved with medication. He is working out and eating regular normal diet.     Pertinent Family History: History reviewed. No pertinent family history.     Last Health Maintenance Exam: 2024  Colonoscopy:  No recommendations at this time   PSA:  There are no preventive care reminders to display for this patient.   Reported Health: Good    History reviewed. No pertinent past medical history.  History reviewed. No pertinent surgical history.  Social History     Socioeconomic History    Marital status: Single   Tobacco Use    Smoking status: Never    Smokeless tobacco: Never   Vaping Use    Vaping status: Never Used   Substance and Sexual Activity    Alcohol use: Yes    Drug use: Yes     Types: Cannabis     Medications Ordered Prior to Encounter[1]    Review of Systems   Constitutional: Negative for fever, chills and fatigue.   HENT: Negative for hearing loss, congestion, sore throat and neck pain.    Eyes: Negative for pain and visual disturbance.   Respiratory: Negative for cough and shortness of breath.    Cardiovascular: Negative for chest pain and palpitations.   Gastrointestinal: Negative for nausea, vomiting, abdominal pain and diarrhea.   Genitourinary: Negative for urgency, frequency and difficulty urinating.   Musculoskeletal: Negative for arthralgias and gait problem.   Skin: Negative for color change and rash.   Neurological: Negative for tremors, weakness and numbness.   Hematological: Negative for adenopathy. Does not bruise/bleed easily.   Psychiatric/Behavioral: Negative for confusion and agitation. The patient is not nervous/anxious.      /72   Pulse 80   Temp 98.1 °F (36.7 °C)   Resp 14   Ht 5' 7\" (1.702 m)   Wt 197 lb (89.4 kg)   SpO2 98%   BMI 30.85 kg/m²   Physical Exam   Constitutional: He is oriented to person, place, and  time. He appears well-developed. No distress.   HENT: Normocephalic and atraumatic. Nose normal. TMs pearly gray, + light reflex.  Mucous membranes moist, dentition normal.  Oropharynx without erythema, exudate or tonsillar hypertrophy  Eyes: EOM are normal. Pupils are equal, round, and reactive to light. No scleral icterus. Fundoscopic exam: Red reflex b/l. No exudates, hemorrhages, a/v nicking, or papilledema seen b/l.  Neck: Normal range of motion. No thyromegaly present.   Cardiovascular: Normal rate, regular rhythm and normal heart sounds.  Exam reveals no friction rub, no murmur heard.  Pulmonary/Chest: Effort normal and breath sounds normal b/l. He has no wheezes or rales.   Abdominal: Soft. Bowel sounds are normal. There is no tenderness. No HSM.  Abdominal aorta normal in size, no hernia appreciated.  Musculoskeletal: Normal range of motion. He exhibits no edema.   Lymphadenopathy: He has no cervical or supraclavicular adenopathy.   : deferred  Neurological: He is alert and oriented to person, place, and time.  DTRs +2 and symmetric b/l.   Skin: Skin is warm. No rash noted. No erythema, pallor or jaundice.   Psychiatric: He has a normal mood and affect. His behavior is normal.       Assessment and Plan:  Ryan Bowser is a 24 year old male here for a wellness exam  Age appropriate cancer screening, labs, safety, immunizations were discussed with the patient and ordered as follows:    Depression/anxiety- transition from lexapro to wellbutrin d/t weight gain follow up in 3 months  Orders Placed This Encounter   Procedures    CBC With Differential With Platelet     Standing Status:   Future     Standing Expiration Date:   1/16/2026    Comp Metabolic Panel (14)     Standing Status:   Future     Standing Expiration Date:   1/11/2026    Lipid Panel     Standing Status:   Future     Standing Expiration Date:   1/11/2026    TSH W Reflex To Free T4     Standing Status:   Future     Standing Expiration Date:    1/11/2026    Urinalysis, Routine     Standing Status:   Future     Standing Expiration Date:   1/16/2026          His 5 year prevention plan includes: annual physical and labs. 30 mins exercise most days of the week and heart healthy diet   Patient/Caregiver Education:  Patient/Caregiver Education: There are no barriers to learning. Medical education done.  Outcome: Patient verbalizes understanding.      Educated by: MEHNAZ Rollins           [1]   Current Outpatient Medications on File Prior to Visit   Medication Sig Dispense Refill    cetirizine 10 MG Oral Tab Take 1 tablet (10 mg total) by mouth as needed for Allergies.       No current facility-administered medications on file prior to visit.

## 2025-01-16 NOTE — PATIENT INSTRUCTIONS
Lexapro every other day for 2 weeks then stop  Wellbutrin 150 mg x 1 week then increase 300 mg daily

## 2025-02-10 DIAGNOSIS — F33.1 MODERATE RECURRENT MAJOR DEPRESSION (HCC): ICD-10-CM

## 2025-02-10 DIAGNOSIS — F41.9 ANXIETY: ICD-10-CM

## 2025-02-10 RX ORDER — BUPROPION HYDROCHLORIDE 150 MG/1
TABLET ORAL
Qty: 30 TABLET | Refills: 0 | Status: SHIPPED | OUTPATIENT
Start: 2025-02-10

## 2025-02-10 NOTE — TELEPHONE ENCOUNTER
Last time medication was refilled 01/16/2025  Last office visit  01/16/2025  Next office visit due/scheduled   Future Appointments   Date Time Provider Department Center   4/18/2025  8:00 AM Gisela Hubbard APRN [facility] [address] [address]       Medication not on protocol.